# Patient Record
Sex: FEMALE | Race: WHITE | Employment: FULL TIME | URBAN - METROPOLITAN AREA
[De-identification: names, ages, dates, MRNs, and addresses within clinical notes are randomized per-mention and may not be internally consistent; named-entity substitution may affect disease eponyms.]

---

## 2023-08-28 ENCOUNTER — APPOINTMENT (OUTPATIENT)
Dept: CT IMAGING | Facility: CLINIC | Age: 23
End: 2023-08-28
Attending: EMERGENCY MEDICINE
Payer: COMMERCIAL

## 2023-08-28 ENCOUNTER — ANESTHESIA (OUTPATIENT)
Dept: SURGERY | Facility: CLINIC | Age: 23
End: 2023-08-28
Payer: COMMERCIAL

## 2023-08-28 ENCOUNTER — HOSPITAL ENCOUNTER (OUTPATIENT)
Facility: CLINIC | Age: 23
Setting detail: OBSERVATION
Discharge: HOME | End: 2023-08-28
Attending: EMERGENCY MEDICINE | Admitting: SURGERY
Payer: COMMERCIAL

## 2023-08-28 ENCOUNTER — ANESTHESIA EVENT (OUTPATIENT)
Dept: SURGERY | Facility: CLINIC | Age: 23
End: 2023-08-28
Payer: COMMERCIAL

## 2023-08-28 VITALS
DIASTOLIC BLOOD PRESSURE: 60 MMHG | RESPIRATION RATE: 16 BRPM | SYSTOLIC BLOOD PRESSURE: 100 MMHG | WEIGHT: 111.4 LBS | TEMPERATURE: 97.8 F | HEART RATE: 93 BPM | OXYGEN SATURATION: 99 %

## 2023-08-28 DIAGNOSIS — K35.30 ACUTE APPENDICITIS WITH LOCALIZED PERITONITIS, WITHOUT PERFORATION, ABSCESS, OR GANGRENE: ICD-10-CM

## 2023-08-28 LAB
ALBUMIN SERPL BCG-MCNC: 4.4 G/DL (ref 3.5–5.2)
ALP SERPL-CCNC: 58 U/L (ref 35–104)
ALT SERPL W P-5'-P-CCNC: 16 U/L (ref 0–50)
ANION GAP SERPL CALCULATED.3IONS-SCNC: 12 MMOL/L (ref 7–15)
AST SERPL W P-5'-P-CCNC: 18 U/L (ref 0–45)
BASOPHILS # BLD AUTO: 0.1 10E3/UL (ref 0–0.2)
BASOPHILS NFR BLD AUTO: 1 %
BILIRUB SERPL-MCNC: 0.5 MG/DL
BUN SERPL-MCNC: 7.8 MG/DL (ref 6–20)
CALCIUM SERPL-MCNC: 10.3 MG/DL (ref 8.6–10)
CHLORIDE SERPL-SCNC: 99 MMOL/L (ref 98–107)
CREAT SERPL-MCNC: 0.68 MG/DL (ref 0.51–0.95)
DEPRECATED HCO3 PLAS-SCNC: 26 MMOL/L (ref 22–29)
EOSINOPHIL # BLD AUTO: 0.4 10E3/UL (ref 0–0.7)
EOSINOPHIL NFR BLD AUTO: 3 %
ERYTHROCYTE [DISTWIDTH] IN BLOOD BY AUTOMATED COUNT: 11.7 % (ref 10–15)
GFR SERPL CREATININE-BSD FRML MDRD: >90 ML/MIN/1.73M2
GLUCOSE SERPL-MCNC: 101 MG/DL (ref 70–99)
HCG SERPL QL: NEGATIVE
HCT VFR BLD AUTO: 38.8 % (ref 35–47)
HGB BLD-MCNC: 13.9 G/DL (ref 11.7–15.7)
IMM GRANULOCYTES # BLD: 0 10E3/UL
IMM GRANULOCYTES NFR BLD: 0 %
LIPASE SERPL-CCNC: 34 U/L (ref 13–60)
LYMPHOCYTES # BLD AUTO: 2 10E3/UL (ref 0.8–5.3)
LYMPHOCYTES NFR BLD AUTO: 15 %
MCH RBC QN AUTO: 32.8 PG (ref 26.5–33)
MCHC RBC AUTO-ENTMCNC: 35.8 G/DL (ref 31.5–36.5)
MCV RBC AUTO: 92 FL (ref 78–100)
MONOCYTES # BLD AUTO: 0.8 10E3/UL (ref 0–1.3)
MONOCYTES NFR BLD AUTO: 6 %
NEUTROPHILS # BLD AUTO: 10.3 10E3/UL (ref 1.6–8.3)
NEUTROPHILS NFR BLD AUTO: 75 %
NRBC # BLD AUTO: 0 10E3/UL
NRBC BLD AUTO-RTO: 0 /100
PLATELET # BLD AUTO: 214 10E3/UL (ref 150–450)
POTASSIUM SERPL-SCNC: 3.5 MMOL/L (ref 3.4–5.3)
PROT SERPL-MCNC: 7.2 G/DL (ref 6.4–8.3)
RBC # BLD AUTO: 4.24 10E6/UL (ref 3.8–5.2)
SODIUM SERPL-SCNC: 137 MMOL/L (ref 136–145)
WBC # BLD AUTO: 13.7 10E3/UL (ref 4–11)

## 2023-08-28 PROCEDURE — 250N000011 HC RX IP 250 OP 636: Mod: JZ | Performed by: SURGERY

## 2023-08-28 PROCEDURE — 99221 1ST HOSP IP/OBS SF/LOW 40: CPT | Mod: 57 | Performed by: SURGERY

## 2023-08-28 PROCEDURE — 83690 ASSAY OF LIPASE: CPT | Performed by: EMERGENCY MEDICINE

## 2023-08-28 PROCEDURE — 999N000141 HC STATISTIC PRE-PROCEDURE NURSING ASSESSMENT: Performed by: SURGERY

## 2023-08-28 PROCEDURE — 96375 TX/PRO/DX INJ NEW DRUG ADDON: CPT | Mod: XU | Performed by: EMERGENCY MEDICINE

## 2023-08-28 PROCEDURE — 250N000009 HC RX 250: Performed by: NURSE ANESTHETIST, CERTIFIED REGISTERED

## 2023-08-28 PROCEDURE — 74177 CT ABD & PELVIS W/CONTRAST: CPT

## 2023-08-28 PROCEDURE — 250N000009 HC RX 250: Performed by: ANESTHESIOLOGY

## 2023-08-28 PROCEDURE — 88304 TISSUE EXAM BY PATHOLOGIST: CPT | Mod: 26 | Performed by: PATHOLOGY

## 2023-08-28 PROCEDURE — 250N000011 HC RX IP 250 OP 636: Performed by: NURSE ANESTHETIST, CERTIFIED REGISTERED

## 2023-08-28 PROCEDURE — 258N000003 HC RX IP 258 OP 636: Performed by: ANESTHESIOLOGY

## 2023-08-28 PROCEDURE — 258N000003 HC RX IP 258 OP 636: Performed by: NURSE ANESTHETIST, CERTIFIED REGISTERED

## 2023-08-28 PROCEDURE — 96361 HYDRATE IV INFUSION ADD-ON: CPT | Performed by: EMERGENCY MEDICINE

## 2023-08-28 PROCEDURE — 250N000025 HC SEVOFLURANE, PER MIN: Performed by: SURGERY

## 2023-08-28 PROCEDURE — 99285 EMERGENCY DEPT VISIT HI MDM: CPT | Mod: 25 | Performed by: EMERGENCY MEDICINE

## 2023-08-28 PROCEDURE — 44970 LAPAROSCOPY APPENDECTOMY: CPT | Mod: GC | Performed by: SURGERY

## 2023-08-28 PROCEDURE — 96365 THER/PROPH/DIAG IV INF INIT: CPT | Mod: 59 | Performed by: EMERGENCY MEDICINE

## 2023-08-28 PROCEDURE — 99285 EMERGENCY DEPT VISIT HI MDM: CPT | Performed by: EMERGENCY MEDICINE

## 2023-08-28 PROCEDURE — 85025 COMPLETE CBC W/AUTO DIFF WBC: CPT | Performed by: EMERGENCY MEDICINE

## 2023-08-28 PROCEDURE — 80053 COMPREHEN METABOLIC PANEL: CPT | Performed by: EMERGENCY MEDICINE

## 2023-08-28 PROCEDURE — 36415 COLL VENOUS BLD VENIPUNCTURE: CPT | Performed by: EMERGENCY MEDICINE

## 2023-08-28 PROCEDURE — 710N000010 HC RECOVERY PHASE 1, LEVEL 2, PER MIN: Performed by: SURGERY

## 2023-08-28 PROCEDURE — 360N000076 HC SURGERY LEVEL 3, PER MIN: Performed by: SURGERY

## 2023-08-28 PROCEDURE — 250N000009 HC RX 250: Performed by: EMERGENCY MEDICINE

## 2023-08-28 PROCEDURE — 250N000009 HC RX 250: Performed by: SURGERY

## 2023-08-28 PROCEDURE — 258N000003 HC RX IP 258 OP 636: Performed by: EMERGENCY MEDICINE

## 2023-08-28 PROCEDURE — 250N000011 HC RX IP 250 OP 636: Mod: JZ | Performed by: EMERGENCY MEDICINE

## 2023-08-28 PROCEDURE — 88304 TISSUE EXAM BY PATHOLOGIST: CPT | Mod: TC | Performed by: SURGERY

## 2023-08-28 PROCEDURE — 710N000012 HC RECOVERY PHASE 2, PER MINUTE: Performed by: SURGERY

## 2023-08-28 PROCEDURE — 84703 CHORIONIC GONADOTROPIN ASSAY: CPT | Performed by: EMERGENCY MEDICINE

## 2023-08-28 PROCEDURE — 370N000017 HC ANESTHESIA TECHNICAL FEE, PER MIN: Performed by: SURGERY

## 2023-08-28 PROCEDURE — 250N000013 HC RX MED GY IP 250 OP 250 PS 637: Performed by: ANESTHESIOLOGY

## 2023-08-28 PROCEDURE — 250N000011 HC RX IP 250 OP 636: Performed by: STUDENT IN AN ORGANIZED HEALTH CARE EDUCATION/TRAINING PROGRAM

## 2023-08-28 PROCEDURE — 272N000001 HC OR GENERAL SUPPLY STERILE: Performed by: SURGERY

## 2023-08-28 RX ORDER — EPHEDRINE SULFATE 50 MG/ML
5 INJECTION, SOLUTION INTRAMUSCULAR; INTRAVENOUS; SUBCUTANEOUS ONCE
Status: COMPLETED | OUTPATIENT
Start: 2023-08-28 | End: 2023-08-28

## 2023-08-28 RX ORDER — LIDOCAINE HYDROCHLORIDE 10 MG/ML
INJECTION, SOLUTION EPIDURAL; INFILTRATION; INTRACAUDAL; PERINEURAL PRN
Status: DISCONTINUED | OUTPATIENT
Start: 2023-08-28 | End: 2023-08-28 | Stop reason: HOSPADM

## 2023-08-28 RX ORDER — IOPAMIDOL 755 MG/ML
100 INJECTION, SOLUTION INTRAVASCULAR ONCE
Status: COMPLETED | OUTPATIENT
Start: 2023-08-28 | End: 2023-08-28

## 2023-08-28 RX ORDER — SPIRONOLACTONE 50 MG/1
50 TABLET, FILM COATED ORAL DAILY
COMMUNITY

## 2023-08-28 RX ORDER — ONDANSETRON 2 MG/ML
4 INJECTION INTRAMUSCULAR; INTRAVENOUS EVERY 30 MIN PRN
Status: DISCONTINUED | OUTPATIENT
Start: 2023-08-28 | End: 2023-08-28 | Stop reason: HOSPADM

## 2023-08-28 RX ORDER — FENTANYL CITRATE 50 UG/ML
INJECTION, SOLUTION INTRAMUSCULAR; INTRAVENOUS PRN
Status: DISCONTINUED | OUTPATIENT
Start: 2023-08-28 | End: 2023-08-28

## 2023-08-28 RX ORDER — CEFAZOLIN SODIUM/WATER 2 G/20 ML
2 SYRINGE (ML) INTRAVENOUS SEE ADMIN INSTRUCTIONS
Status: DISCONTINUED | OUTPATIENT
Start: 2023-08-28 | End: 2023-08-28 | Stop reason: HOSPADM

## 2023-08-28 RX ORDER — HYDROMORPHONE HCL IN WATER/PF 6 MG/30 ML
0.4 PATIENT CONTROLLED ANALGESIA SYRINGE INTRAVENOUS EVERY 5 MIN PRN
Status: DISCONTINUED | OUTPATIENT
Start: 2023-08-28 | End: 2023-08-28 | Stop reason: HOSPADM

## 2023-08-28 RX ORDER — EPHEDRINE SULFATE 50 MG/ML
10 INJECTION, SOLUTION INTRAMUSCULAR; INTRAVENOUS; SUBCUTANEOUS ONCE
Status: COMPLETED | OUTPATIENT
Start: 2023-08-28 | End: 2023-08-28

## 2023-08-28 RX ORDER — KETOROLAC TROMETHAMINE 15 MG/ML
15 INJECTION, SOLUTION INTRAMUSCULAR; INTRAVENOUS ONCE
Status: COMPLETED | OUTPATIENT
Start: 2023-08-28 | End: 2023-08-28

## 2023-08-28 RX ORDER — IBUPROFEN 600 MG/1
600 TABLET, FILM COATED ORAL EVERY 6 HOURS PRN
Qty: 28 TABLET | Refills: 0 | Status: SHIPPED | OUTPATIENT
Start: 2023-08-28 | End: 2023-09-04

## 2023-08-28 RX ORDER — DEXAMETHASONE SODIUM PHOSPHATE 4 MG/ML
INJECTION, SOLUTION INTRA-ARTICULAR; INTRALESIONAL; INTRAMUSCULAR; INTRAVENOUS; SOFT TISSUE PRN
Status: DISCONTINUED | OUTPATIENT
Start: 2023-08-28 | End: 2023-08-28

## 2023-08-28 RX ORDER — BUPIVACAINE HYDROCHLORIDE 2.5 MG/ML
INJECTION, SOLUTION EPIDURAL; INFILTRATION; INTRACAUDAL PRN
Status: DISCONTINUED | OUTPATIENT
Start: 2023-08-28 | End: 2023-08-28 | Stop reason: HOSPADM

## 2023-08-28 RX ORDER — KETOROLAC TROMETHAMINE 30 MG/ML
INJECTION, SOLUTION INTRAMUSCULAR; INTRAVENOUS PRN
Status: DISCONTINUED | OUTPATIENT
Start: 2023-08-28 | End: 2023-08-28

## 2023-08-28 RX ORDER — ACETAMINOPHEN 325 MG/1
650 TABLET ORAL ONCE
Status: COMPLETED | OUTPATIENT
Start: 2023-08-28 | End: 2023-08-28

## 2023-08-28 RX ORDER — ERTAPENEM 1 G/1
1 INJECTION, POWDER, LYOPHILIZED, FOR SOLUTION INTRAMUSCULAR; INTRAVENOUS ONCE
Status: COMPLETED | OUTPATIENT
Start: 2023-08-28 | End: 2023-08-28

## 2023-08-28 RX ORDER — ONDANSETRON 4 MG/1
4 TABLET, ORALLY DISINTEGRATING ORAL EVERY 30 MIN PRN
Status: DISCONTINUED | OUTPATIENT
Start: 2023-08-28 | End: 2023-08-28 | Stop reason: HOSPADM

## 2023-08-28 RX ORDER — ACETAMINOPHEN 500 MG
1000 TABLET ORAL EVERY 6 HOURS PRN
Status: ON HOLD | COMMUNITY
End: 2023-08-28

## 2023-08-28 RX ORDER — LIDOCAINE HYDROCHLORIDE 20 MG/ML
INJECTION, SOLUTION INFILTRATION; PERINEURAL PRN
Status: DISCONTINUED | OUTPATIENT
Start: 2023-08-28 | End: 2023-08-28

## 2023-08-28 RX ORDER — ONDANSETRON 2 MG/ML
4 INJECTION INTRAMUSCULAR; INTRAVENOUS EVERY 6 HOURS PRN
Status: DISCONTINUED | OUTPATIENT
Start: 2023-08-28 | End: 2023-08-28 | Stop reason: HOSPADM

## 2023-08-28 RX ORDER — ACETAMINOPHEN 500 MG
1000 TABLET ORAL 3 TIMES DAILY
Qty: 42 TABLET | Refills: 0 | Status: SHIPPED | OUTPATIENT
Start: 2023-08-28 | End: 2023-09-04

## 2023-08-28 RX ORDER — ONDANSETRON 4 MG/1
4 TABLET, ORALLY DISINTEGRATING ORAL EVERY 8 HOURS PRN
Qty: 4 TABLET | Refills: 0 | Status: SHIPPED | OUTPATIENT
Start: 2023-08-28

## 2023-08-28 RX ORDER — ONDANSETRON 4 MG/1
4 TABLET, ORALLY DISINTEGRATING ORAL EVERY 6 HOURS PRN
Status: DISCONTINUED | OUTPATIENT
Start: 2023-08-28 | End: 2023-08-28 | Stop reason: HOSPADM

## 2023-08-28 RX ORDER — POLYETHYLENE GLYCOL 3350 17 G/17G
17 POWDER, FOR SOLUTION ORAL DAILY
Status: DISCONTINUED | OUTPATIENT
Start: 2023-08-28 | End: 2023-08-28 | Stop reason: HOSPADM

## 2023-08-28 RX ORDER — SODIUM CHLORIDE, SODIUM GLUCONATE, SODIUM ACETATE, POTASSIUM CHLORIDE AND MAGNESIUM CHLORIDE 526; 502; 368; 37; 30 MG/100ML; MG/100ML; MG/100ML; MG/100ML; MG/100ML
INJECTION, SOLUTION INTRAVENOUS CONTINUOUS PRN
Status: DISCONTINUED | OUTPATIENT
Start: 2023-08-28 | End: 2023-08-28

## 2023-08-28 RX ORDER — PROPOFOL 10 MG/ML
INJECTION, EMULSION INTRAVENOUS PRN
Status: DISCONTINUED | OUTPATIENT
Start: 2023-08-28 | End: 2023-08-28

## 2023-08-28 RX ORDER — SODIUM CHLORIDE, SODIUM LACTATE, POTASSIUM CHLORIDE, CALCIUM CHLORIDE 600; 310; 30; 20 MG/100ML; MG/100ML; MG/100ML; MG/100ML
INJECTION, SOLUTION INTRAVENOUS CONTINUOUS
Status: DISCONTINUED | OUTPATIENT
Start: 2023-08-28 | End: 2023-08-28 | Stop reason: HOSPADM

## 2023-08-28 RX ORDER — PROCHLORPERAZINE 25 MG
25 SUPPOSITORY, RECTAL RECTAL EVERY 12 HOURS PRN
Status: DISCONTINUED | OUTPATIENT
Start: 2023-08-28 | End: 2023-08-28 | Stop reason: HOSPADM

## 2023-08-28 RX ORDER — FENTANYL CITRATE 50 UG/ML
25 INJECTION, SOLUTION INTRAMUSCULAR; INTRAVENOUS EVERY 5 MIN PRN
Status: DISCONTINUED | OUTPATIENT
Start: 2023-08-28 | End: 2023-08-28 | Stop reason: HOSPADM

## 2023-08-28 RX ORDER — OXYCODONE HYDROCHLORIDE 10 MG/1
10 TABLET ORAL
Status: DISCONTINUED | OUTPATIENT
Start: 2023-08-28 | End: 2023-08-28 | Stop reason: HOSPADM

## 2023-08-28 RX ORDER — FENTANYL CITRATE 50 UG/ML
50 INJECTION, SOLUTION INTRAMUSCULAR; INTRAVENOUS EVERY 5 MIN PRN
Status: DISCONTINUED | OUTPATIENT
Start: 2023-08-28 | End: 2023-08-28 | Stop reason: HOSPADM

## 2023-08-28 RX ORDER — PROCHLORPERAZINE MALEATE 5 MG
10 TABLET ORAL EVERY 6 HOURS PRN
Status: DISCONTINUED | OUTPATIENT
Start: 2023-08-28 | End: 2023-08-28 | Stop reason: HOSPADM

## 2023-08-28 RX ORDER — HYDROMORPHONE HCL IN WATER/PF 6 MG/30 ML
0.2 PATIENT CONTROLLED ANALGESIA SYRINGE INTRAVENOUS EVERY 5 MIN PRN
Status: DISCONTINUED | OUTPATIENT
Start: 2023-08-28 | End: 2023-08-28 | Stop reason: HOSPADM

## 2023-08-28 RX ORDER — CEFAZOLIN SODIUM/WATER 2 G/20 ML
2 SYRINGE (ML) INTRAVENOUS
Status: DISCONTINUED | OUTPATIENT
Start: 2023-08-28 | End: 2023-08-28 | Stop reason: HOSPADM

## 2023-08-28 RX ORDER — METHOCARBAMOL 500 MG/1
500 TABLET, FILM COATED ORAL 4 TIMES DAILY PRN
Qty: 28 TABLET | Refills: 0 | Status: SHIPPED | OUTPATIENT
Start: 2023-08-28 | End: 2023-09-04

## 2023-08-28 RX ORDER — OXYCODONE HYDROCHLORIDE 5 MG/1
5 TABLET ORAL
Status: DISCONTINUED | OUTPATIENT
Start: 2023-08-28 | End: 2023-08-28 | Stop reason: HOSPADM

## 2023-08-28 RX ADMIN — Medication 10 MG: at 13:20

## 2023-08-28 RX ADMIN — IOPAMIDOL 54 ML: 755 INJECTION, SOLUTION INTRAVENOUS at 01:45

## 2023-08-28 RX ADMIN — ERTAPENEM SODIUM 1 G: 1 INJECTION, POWDER, LYOPHILIZED, FOR SOLUTION INTRAMUSCULAR; INTRAVENOUS at 03:03

## 2023-08-28 RX ADMIN — PROPOFOL 150 MG: 10 INJECTION, EMULSION INTRAVENOUS at 12:15

## 2023-08-28 RX ADMIN — FENTANYL CITRATE 150 MCG: 50 INJECTION, SOLUTION INTRAMUSCULAR; INTRAVENOUS at 12:15

## 2023-08-28 RX ADMIN — LIDOCAINE HYDROCHLORIDE 80 MG: 20 INJECTION, SOLUTION INFILTRATION; PERINEURAL at 12:15

## 2023-08-28 RX ADMIN — EPHEDRINE SULFATE 5 MG: 5 INJECTION INTRAVENOUS at 15:41

## 2023-08-28 RX ADMIN — DEXMEDETOMIDINE HYDROCHLORIDE 12 MCG: 100 INJECTION, SOLUTION INTRAVENOUS at 12:01

## 2023-08-28 RX ADMIN — KETOROLAC TROMETHAMINE 30 MG: 30 INJECTION, SOLUTION INTRAMUSCULAR at 13:47

## 2023-08-28 RX ADMIN — SODIUM CHLORIDE, SODIUM GLUCONATE, SODIUM ACETATE, POTASSIUM CHLORIDE AND MAGNESIUM CHLORIDE: 526; 502; 368; 37; 30 INJECTION, SOLUTION INTRAVENOUS at 12:01

## 2023-08-28 RX ADMIN — EPHEDRINE SULFATE 5 MG: 5 INJECTION INTRAVENOUS at 14:29

## 2023-08-28 RX ADMIN — KETOROLAC TROMETHAMINE 15 MG: 15 INJECTION, SOLUTION INTRAMUSCULAR; INTRAVENOUS at 01:35

## 2023-08-28 RX ADMIN — DEXAMETHASONE SODIUM PHOSPHATE 6 MG: 4 INJECTION, SOLUTION INTRA-ARTICULAR; INTRALESIONAL; INTRAMUSCULAR; INTRAVENOUS; SOFT TISSUE at 12:23

## 2023-08-28 RX ADMIN — EPHEDRINE SULFATE 10 MG: 5 INJECTION INTRAVENOUS at 16:18

## 2023-08-28 RX ADMIN — SUGAMMADEX 200 MG: 100 INJECTION, SOLUTION INTRAVENOUS at 13:59

## 2023-08-28 RX ADMIN — ACETAMINOPHEN 650 MG: 325 TABLET, FILM COATED ORAL at 17:50

## 2023-08-28 RX ADMIN — SODIUM CHLORIDE 55 ML: 9 INJECTION, SOLUTION INTRAVENOUS at 01:46

## 2023-08-28 RX ADMIN — SODIUM CHLORIDE 1000 ML: 9 INJECTION, SOLUTION INTRAVENOUS at 01:35

## 2023-08-28 RX ADMIN — Medication 2 G: at 12:30

## 2023-08-28 RX ADMIN — DEXMEDETOMIDINE HYDROCHLORIDE 1 MCG/KG/HR: 100 INJECTION, SOLUTION INTRAVENOUS at 12:05

## 2023-08-28 RX ADMIN — SODIUM CHLORIDE, SODIUM GLUCONATE, SODIUM ACETATE, POTASSIUM CHLORIDE AND MAGNESIUM CHLORIDE: 526; 502; 368; 37; 30 INJECTION, SOLUTION INTRAVENOUS at 12:37

## 2023-08-28 RX ADMIN — Medication 50 MG: at 12:15

## 2023-08-28 RX ADMIN — ONDANSETRON 4 MG: 2 INJECTION INTRAMUSCULAR; INTRAVENOUS at 01:35

## 2023-08-28 RX ADMIN — EPHEDRINE SULFATE 5 MG: 5 INJECTION INTRAVENOUS at 15:06

## 2023-08-28 ASSESSMENT — ACTIVITIES OF DAILY LIVING (ADL)
ADLS_ACUITY_SCORE: 35

## 2023-08-28 ASSESSMENT — LIFESTYLE VARIABLES: TOBACCO_USE: 1

## 2023-08-28 NOTE — CONSULTS
[facility]    Consult Note - EGS Service  Date of Admission:  8/28/2023  Consult Requested by: ED staff  Reason for Consult: Acute appendicitis    Assessment & Plan: Surgery   Matilde Granger is a 23 year old female admitted on 8/28/2023. She presents with one day history of RLQ pain with imaging demonstrating dilated appendix with periappendiceal fat stranding consistent with acute appendicitis. Will consent patient for laparoscopic appendectomy.     - consented for lap appy  - NPO, MIVFs  - abx   - pt states that she would not like any narcotics post-operatively        Drains: None     Code Status:  Full    Clinically Significant Risk Factors Present on Admission           # Hypercalcemia: Highest Ca = 10.3 mg/dL in last 2 days, will monitor as appropriate                      The patient's care was discussed with Dr. Vera.    Dejah Cummins MD  [facility]  Non-urgent messages: Securely message with [employer] (more info)  Text page via [employer] Paging/Directory     ______________________________________________________________________    Chief Complaint   Abdominal Pain    History is obtained from the patient    History of Present Illness   Matilde Granger is a 23 year old female with PMHx of substance abuse disorder (no longer actively using) and asthma who presents with acute abdominal pain.     She states that her pain started yesterday morning in her RLQ. She states that her abdominal pain has been persistent and associated with nausea. She denies any emesis, fevers/chills, SOB, chest pain, or dysuria. She states that she last ate a meal yesterday. She denies being on blood thinners. In the ED, her vitals were wnl with labs demonstrating leukocytosis of 13.4K. She underwent a CT A/P that demonstrated dilated appendix with periappendiceal fat stranding without evidence appendiceal perforation or periappendiceal  fluid collection.     Past Medical History    Asthma   Substance Abuse disorder (currently sober)    Past Surgical History   No past surgical history on file.    Prior to Admission Medications   I have reviewed this patient's current medications     Physical Exam   Vital Signs: Temp: 99.3  F (37.4  C) Temp src: Oral BP: 102/59 Pulse: 81   Resp: 16 SpO2: 96 % O2 Device: None (Room air)    Weight: 111 lbs 6.4 ozNo intake or output data in the 24 hours ending 08/28/23 0809    GENERAL: NAD, resting comfortably in bed  HEENT: atraumatic, normocephalic, no scleral icterus  CARDIO: normal rate and regular rhythm, no LE edema  PULM: no increased WOB  ABD: non-distended, soft, and RLQ tenderness  NEURO: CN II-XII grossly intact, no focal neuro deficits  PSYCH: appropriate affect and behavior    Data     I have personally reviewed the following data over the past 24 hrs:    13.7 (H)  \   13.9   / 214     137 99 7.8 /  101 (H)   3.5 26 0.68 \     ALT: 16 AST: 18 AP: 58 TBILI: 0.5   ALB: 4.4 TOT PROTEIN: 7.2 LIPASE: 34       Imaging results reviewed over the past 24 hrs:   Recent Results (from the past 24 hour(s))   CT Abdomen Pelvis w Contrast    Narrative    EXAM: CT ABDOMEN PELVIS W CONTRAST  LOCATION: [facility]  DATE: 8/28/2023    INDICATION: RLQ pain, c f appendicitis.  COMPARISON: None.  TECHNIQUE: CT scan of the abdomen and pelvis was performed after the injection of 54 ml of Isov 370 intravenously. Multiplanar reformats were obtained. Dose reduction techniques were used.    FINDINGS:   LOWER CHEST: 4 mm left lower lobe subpleural nodule (series 5 image 25).    ABDOMEN/PELVIS:    HEPATOBILIARY: No suspicious focal hepatic lesion. No radiodense gallstones.    PANCREAS: No main pancreatic ductal dilatation or definite solid pancreatic mass.    SPLEEN: No splenomegaly.    ADRENAL GLANDS: No adrenal nodules.    KIDNEYS/BLADDER: No radiodense kidney/ureteral stones or  hydronephrosis in either kidney.    BOWEL: No abnormally dilated bowel loops. Dilated appendix containing appendicolith with mild periappendiceal fat stranding, findings worrisome for acute appendicitis. No evidence of appendiceal perforation or periappendiceal loculated collection.   Moderate amount of stool predominantly in the right and transverse colon, nonspecific, can be seen with constipation.    PERITONEUM: No evidence of free fluid in the abdomen and pelvis. No free peritoneal or portal venous gas.    PELVIC ORGANS: Unremarkable.    VASCULATURE: Unremarkable.    LYMPH NODES: No significant abdominopelvic lymphadenopathy.    MUSCULOSKELETAL: No suspicious osseous lesion.      Impression    IMPRESSION:   1. Dilated appendix with periappendiceal fat stranding, findings worrisome for acute appendicitis. No evidence of appendiceal perforation or periappendiceal loculated collection.  2. 4 mm left lower lobe pulmonary nodule, as per Fleischner Society criteria, for low risk patient, no routine follow-up is recommended and for high-risk patient, optional chest CT in 12 months can be considered.

## 2023-08-28 NOTE — PROVIDER NOTIFICATION
Patient continues to have soft BPs. Patient alert and oriented x4; mentating well. Dr. Deal at bedside to give another dose of ephedrine.

## 2023-08-28 NOTE — ANESTHESIA PREPROCEDURE EVALUATION
Anesthesia Pre-Procedure Evaluation    Patient: Matilde Granger   MRN: [id number] : 2000        Procedure : Procedure(s):  LAPAROSCOPIC APPENDECTOMY, POSSIBLE OPEN          History reviewed. No pertinent past medical history.   History reviewed. No pertinent surgical history.   No Known Allergies   Social History     Tobacco Use    Smoking status: Not on file    Smokeless tobacco: Not on file   Substance Use Topics    Alcohol use: Not on file      Wt Readings from Last 1 Encounters:   23 50.5 kg (111 lb 6.4 oz)        Anesthesia Evaluation   Pt has had prior anesthetic. Type: General.        ROS/MED HX  ENT/Pulmonary:     (+)                tobacco use, Past use,    asthma  Treatment: Inhaler prn,                 Neurologic:       Cardiovascular:       METS/Exercise Tolerance:     Hematologic:       Musculoskeletal:       GI/Hepatic:       Renal/Genitourinary:       Endo:       Psychiatric/Substance Use:       Infectious Disease:       Malignancy:       Other:            Physical Exam    Airway        Mallampati: I    Neck ROM: full     Respiratory Devices and Support         Dental       (+) Minor Abnormalities - some fillings, tiny chips      Cardiovascular   cardiovascular exam normal          Pulmonary   pulmonary exam normal                OUTSIDE LABS:  CBC:   Lab Results   Component Value Date    WBC 13.7 (H) 2023    HGB 13.9 2023    HCT 38.8 2023     2023     BMP:   Lab Results   Component Value Date     2023    POTASSIUM 3.5 2023    CHLORIDE 99 2023    CO2 26 2023    BUN 7.8 2023    CR 0.68 2023     (H) 2023     COAGS: No results found for: PTT, INR, FIBR  POC:   Lab Results   Component Value Date    HCGS Negative 2023     HEPATIC:   Lab Results   Component Value Date    ALBUMIN 4.4 2023    PROTTOTAL 7.2 2023    ALT 16 2023    AST 18 2023    ALKPHOS 58 2023    BILITOTAL 0.5  08/28/2023     OTHER:   Lab Results   Component Value Date    NICOLE 10.3 (H) 08/28/2023    LIPASE 34 08/28/2023       Anesthesia Plan    ASA Status:  2, emergent    NPO Status:  NPO Appropriate    Anesthesia Type: General.     - Airway: ETT   Induction: Intravenous, RSI.   Maintenance: Balanced.   Techniques and Equipment:     - Lines/Monitors: 2nd IV     Consents    Anesthesia Plan(s) and associated risks, benefits, and realistic alternatives discussed. Questions answered and patient/representative(s) expressed understanding.     - Discussed: Risks, Benefits and Alternatives for BOTH SEDATION and the PROCEDURE were discussed     - Discussed with:  Patient       Use of blood products discussed: Yes.     - Discussed with: Patient.     Postoperative Care    Pain management: IV analgesics.   PONV prophylaxis: Ondansetron (or other 5HT-3)     Comments:                Sina Gaffney MD

## 2023-08-28 NOTE — PROVIDER NOTIFICATION
Dr. Gfafney notified of hypotension 78/49 (60) after one dose of ephedrine. Order for another dose of ephedrine and will pass on to Dr. Deal.

## 2023-08-28 NOTE — ED PROVIDER NOTES
ED Provider Note  [facility]      History     Chief Complaint   Patient presents with    Abdominal Pain     HPI  Matilde Granger is a 23 year old female PMH of anorexia nervosa, polysubstance dependence who presents ER for evaluation of abdominal pain.    States that she has had severe pain in her lower abdomen.  She started her period yesterday. She states her period seems heavier than normal.Has not had a period in 3 months. This pain is much worse than her normal cramping. It worsens when it is pushed on. She has not had anything like this before.  Patient had nausea but no emesis. She reported feeling dizzy. Urination is normal. No bowel movements today. No appetite.  No hx of abdominal surgery. Takes asthma medications. Has a nexplanon BC implant. Patient states that she is a drug addict in recovery, she is sober and requests no narcotics be given to her during this visit. Patient has already taken tylenol today.    Past Medical History  History reviewed. No pertinent past medical history.  No past surgical history on file.  acetaminophen (TYLENOL) 500 MG tablet  spironolactone (ALDACTONE) 50 MG tablet      No Known Allergies  Family History  No family history on file.  Social History          A medically appropriate review of systems was performed with pertinent positives and negatives noted in the HPI, and all other systems negative.    Physical Exam   BP: 104/69  Pulse: 73  Temp: 98.6  F (37  C)  Resp: 16  Weight: 50.5 kg (111 lb 6.4 oz)  SpO2: 94 %  Physical Exam  Vitals and nursing note reviewed.   Constitutional:       General: She is in acute distress.      Appearance: Normal appearance. She is ill-appearing.   HENT:      Head: Normocephalic.      Nose: Nose normal.   Eyes:      Pupils: Pupils are equal, round, and reactive to light.   Cardiovascular:      Rate and Rhythm: Normal rate and regular rhythm.   Pulmonary:      Effort: Pulmonary effort is normal.   Abdominal:       General: There is no distension.      Comments: Severe tenderness palpation the right lower quadrant.  Guarding present.  No rebound tenderness.  Abdomen soft nondistended.  Positive psoas sign.   Musculoskeletal:         General: No deformity. Normal range of motion.      Cervical back: Normal range of motion.   Skin:     General: Skin is warm.   Neurological:      Mental Status: She is alert and oriented to person, place, and time.   Psychiatric:         Mood and Affect: Mood normal.           ED Course, Procedures, & Data        Results for orders placed or performed during the hospital encounter of 08/28/23   CT Abdomen Pelvis w Contrast     Status: None    Narrative    EXAM: CT ABDOMEN PELVIS W CONTRAST  LOCATION: [facility]  DATE: 8/28/2023    INDICATION: RLQ pain, c f appendicitis.  COMPARISON: None.  TECHNIQUE: CT scan of the abdomen and pelvis was performed after the injection of 54 ml of Isov 370 intravenously. Multiplanar reformats were obtained. Dose reduction techniques were used.    FINDINGS:   LOWER CHEST: 4 mm left lower lobe subpleural nodule (series 5 image 25).    ABDOMEN/PELVIS:    HEPATOBILIARY: No suspicious focal hepatic lesion. No radiodense gallstones.    PANCREAS: No main pancreatic ductal dilatation or definite solid pancreatic mass.    SPLEEN: No splenomegaly.    ADRENAL GLANDS: No adrenal nodules.    KIDNEYS/BLADDER: No radiodense kidney/ureteral stones or hydronephrosis in either kidney.    BOWEL: No abnormally dilated bowel loops. Dilated appendix containing appendicolith with mild periappendiceal fat stranding, findings worrisome for acute appendicitis. No evidence of appendiceal perforation or periappendiceal loculated collection.   Moderate amount of stool predominantly in the right and transverse colon, nonspecific, can be seen with constipation.    PERITONEUM: No evidence of free fluid in the abdomen and pelvis. No free peritoneal or  portal venous gas.    PELVIC ORGANS: Unremarkable.    VASCULATURE: Unremarkable.    LYMPH NODES: No significant abdominopelvic lymphadenopathy.    MUSCULOSKELETAL: No suspicious osseous lesion.      Impression    IMPRESSION:   1. Dilated appendix with periappendiceal fat stranding, findings worrisome for acute appendicitis. No evidence of appendiceal perforation or periappendiceal loculated collection.  2. 4 mm left lower lobe pulmonary nodule, as per Fleischner Society criteria, for low risk patient, no routine follow-up is recommended and for high-risk patient, optional chest CT in 12 months can be considered.   Comprehensive metabolic panel     Status: Abnormal   Result Value Ref Range    Sodium 137 136 - 145 mmol/L    Potassium 3.5 3.4 - 5.3 mmol/L    Chloride 99 98 - 107 mmol/L    Carbon Dioxide (CO2) 26 22 - 29 mmol/L    Anion Gap 12 7 - 15 mmol/L    Urea Nitrogen 7.8 6.0 - 20.0 mg/dL    Creatinine 0.68 0.51 - 0.95 mg/dL    Calcium 10.3 (H) 8.6 - 10.0 mg/dL    Glucose 101 (H) 70 - 99 mg/dL    Alkaline Phosphatase 58 35 - 104 U/L    AST 18 0 - 45 U/L    ALT 16 0 - 50 U/L    Protein Total 7.2 6.4 - 8.3 g/dL    Albumin 4.4 3.5 - 5.2 g/dL    Bilirubin Total 0.5 <=1.2 mg/dL    GFR Estimate >90 >60 mL/min/1.73m2   HCG qualitative Blood     Status: Normal   Result Value Ref Range    hCG Serum Qualitative Negative Negative   Lipase     Status: Normal   Result Value Ref Range    Lipase 34 13 - 60 U/L   CBC with platelets and differential     Status: Abnormal   Result Value Ref Range    WBC Count 13.7 (H) 4.0 - 11.0 10e3/uL    RBC Count 4.24 3.80 - 5.20 10e6/uL    Hemoglobin 13.9 11.7 - 15.7 g/dL    Hematocrit 38.8 35.0 - 47.0 %    MCV 92 78 - 100 fL    MCH 32.8 26.5 - 33.0 pg    MCHC 35.8 31.5 - 36.5 g/dL    RDW 11.7 10.0 - 15.0 %    Platelet Count 214 150 - 450 10e3/uL    % Neutrophils 75 %    % Lymphocytes 15 %    % Monocytes 6 %    % Eosinophils 3 %    % Basophils 1 %    % Immature Granulocytes 0 %    NRBCs per 100  WBC 0 <1 /100    Absolute Neutrophils 10.3 (H) 1.6 - 8.3 10e3/uL    Absolute Lymphocytes 2.0 0.8 - 5.3 10e3/uL    Absolute Monocytes 0.8 0.0 - 1.3 10e3/uL    Absolute Eosinophils 0.4 0.0 - 0.7 10e3/uL    Absolute Basophils 0.1 0.0 - 0.2 10e3/uL    Absolute Immature Granulocytes 0.0 <=0.4 10e3/uL    Absolute NRBCs 0.0 10e3/uL   CBC with platelets differential     Status: Abnormal    Narrative    The following orders were created for panel order CBC with platelets differential.  Procedure                               Abnormality         Status                     ---------                               -----------         ------                     CBC with platelets and d...[[id number]]  Abnormal            Final result                 Please view results for these tests on the individual orders.     Medications   0.9% sodium chloride BOLUS (1,000 mLs Intravenous $New Bag 8/28/23 0135)   ondansetron (ZOFRAN) injection 4 mg (4 mg Intravenous $Given 8/28/23 0135)   ketorolac (TORADOL) injection 15 mg (15 mg Intravenous $Given 8/28/23 0135)   sodium chloride 0.9 % bag 500mL for CT scan flush use (55 mLs As instructed $Given 8/28/23 0146)   iopamidol (ISOVUE-370) solution 100 mL (54 mLs Intravenous $Given 8/28/23 0145)     Labs Ordered and Resulted from Time of ED Arrival to Time of ED Departure   COMPREHENSIVE METABOLIC PANEL - Abnormal       Result Value    Sodium 137      Potassium 3.5      Chloride 99      Carbon Dioxide (CO2) 26      Anion Gap 12      Urea Nitrogen 7.8      Creatinine 0.68      Calcium 10.3 (*)     Glucose 101 (*)     Alkaline Phosphatase 58      AST 18      ALT 16      Protein Total 7.2      Albumin 4.4      Bilirubin Total 0.5      GFR Estimate >90     CBC WITH PLATELETS AND DIFFERENTIAL - Abnormal    WBC Count 13.7 (*)     RBC Count 4.24      Hemoglobin 13.9      Hematocrit 38.8      MCV 92      MCH 32.8      MCHC 35.8      RDW 11.7      Platelet Count 214      % Neutrophils 75      % Lymphocytes  15      % Monocytes 6      % Eosinophils 3      % Basophils 1      % Immature Granulocytes 0      NRBCs per 100 WBC 0      Absolute Neutrophils 10.3 (*)     Absolute Lymphocytes 2.0      Absolute Monocytes 0.8      Absolute Eosinophils 0.4      Absolute Basophils 0.1      Absolute Immature Granulocytes 0.0      Absolute NRBCs 0.0     HCG QUALITATIVE PREGNANCY - Normal    hCG Serum Qualitative Negative     LIPASE - Normal    Lipase 34     ROUTINE UA WITH MICROSCOPIC REFLEX TO CULTURE     CT Abdomen Pelvis w Contrast   Final Result   IMPRESSION:    1. Dilated appendix with periappendiceal fat stranding, findings worrisome for acute appendicitis. No evidence of appendiceal perforation or periappendiceal loculated collection.   2. 4 mm left lower lobe pulmonary nodule, as per Fleischner Society criteria, for low risk patient, no routine follow-up is recommended and for high-risk patient, optional chest CT in 12 months can be considered.             Critical care was not performed.     Medical Decision Making  The patient's presentation was of moderate complexity (an undiagnosed new problem with uncertain diagnosis).    The patient's evaluation involved:  ordering and/or review of 3+ test(s) in this encounter (cbc, cmp, lipase, hcg.  Independently reviewed CT images and radiology interpretation which is concerning for acute appendicitis.)  discussion of management or test interpretation with another health professional (gen surg consult, plan for OR)    The patient's management necessitated moderate risk (prescription drug management including medications given in the ED) and high risk (a decision regarding emergency major procedure (went to the OR with operative service)).    Assessment & Plan    On arrival, patient is normal vital signs patient overall appears to be in severe distress due to her level of abdominal pain.  She has tenderness to palpation in the right lower quadrant with guarding.  Abdomen is otherwise  soft and nondistended.    Labs ordered/reviewed.  White blood cell count is 13.7 with a left neutrophil shift.  Hemoglobin stable at 13.9.  Metabolic panel shows normal electrolytes and normal renal function.  Glucose is 101.  hCG negative.  Lipase is 34.    CT scan shows evidence of acute appendicitis without perforation or abscess.  Patient was started on ertapenem.  Case discussed with general surgery.    Of note, patient admits that she is a recovering drug addict and requests no narcotic pain medication.  Symptoms controlled with IV Toradol and Zofran.    Update:  Spoke w/ gen surg staff.  Would like pt sent to [place] with plan for OR this morning.  D/w [facility] ED staff for transfer.      I have reviewed the nursing notes. I have reviewed the findings, diagnosis, plan and need for follow up with the patient.    New Prescriptions    No medications on file       Final diagnoses:   Acute appendicitis with localized peritonitis, without perforation, abscess, or gangrene     I, Elizabeth Henning, am serving as a trained medical scribe to document services personally performed by Matt Angeles DO, based on the provider's statements to me.     IMatt DO, was physically present and have reviewed and verified the accuracy of this note documented by Elizabeth Henning.    Matt Angeles DO  [facility] EMERGENCY DEPARTMENT  8/28/2023     Matt Angeles DO  08/28/23 0400

## 2023-08-28 NOTE — BRIEF OP NOTE
[facility]    Brief Operative Note    Pre-operative diagnosis: Acute appendicitis with localized peritonitis, without perforation, abscess, or gangrene [K35.30]  Post-operative diagnosis Same as pre-operative diagnosis    Procedure: Procedure(s):  LAPAROSCOPIC APPENDECTOMY  Surgeon: Surgeon(s) and Role:     * Milo Vera MD - Primary     * Dejah Cummins MD - Resident - Assisting     * Carissa Rodríguez MD - Resident - Observing  Anesthesia: General   Estimated Blood Loss: 10cc    Drains: None  Specimens:   ID Type Source Tests Collected by Time Destination   1 : Appendix Tissue Appendix SURGICAL PATHOLOGY EXAM Milo Vera MD 8/28/2023  1:41 PM      Findings: Suppurated and inflammed appendix with a small periappendiceal purulent fluid collection.   Complications: None.  Implants: * No implants in log *    Dejah Cummins,   General Surgery, PGY-2

## 2023-08-28 NOTE — DISCHARGE INSTRUCTIONS
[facility]  Same-Day Surgery   Adult Discharge Orders & Instructions     For 24 hours after surgery    Get plenty of rest.  A responsible adult must stay with you for at least 24 hours after you leave the hospital.   Do not drive or use heavy equipment.  If you have weakness or tingling, don't drive or use heavy equipment until this feeling goes away.  Do not drink alcohol.  Avoid strenuous or risky activities.  Ask for help when climbing stairs.   You may feel lightheaded.  IF so, sit for a few minutes before standing.  Have someone help you get up.   If you have nausea (feel sick to your stomach): Drink only clear liquids such as apple juice, ginger ale, broth or 7-Up.  Rest may also help.  Be sure to drink enough fluids.  Move to a regular diet as you feel able.  You may have a slight fever. Call the doctor if your fever is over 100 F (37.7 C) (taken under the tongue) or lasts longer than 24 hours.  You may have a dry mouth, a sore throat, muscle aches or trouble sleeping.  These should go away after 24 hours.  Do not make important or legal decisions.   Call your doctor for any of the followin.  Signs of infection (fever, growing tenderness at the surgery site, a large amount of drainage or bleeding, severe pain, foul-smelling drainage, redness, swelling).    2. It has been over 8 to 10 hours since surgery and you are still not able to urinate (pass water).    3.  Headache for over 24 hours.    To contact a doctor, call Dr. Vera at General surgery clinic at [phone]  or:    '   [phone] and ask for the resident on call for GENERAL SURGERY (answered 24 hours a day)  '   Emergency Department:    [facility]: [phone]       (TTY for hearing impaired: [phone])

## 2023-08-28 NOTE — ANESTHESIA PROCEDURE NOTES
Airway       Patient location during procedure: OR       Procedure Start/Stop Times: 8/28/2023 12:17 PM  Staff -        Performed By: CRNAIndications and Patient Condition       Indications for airway management: bev-procedural       Induction type:RSI       Mask difficulty assessment: 0 - not attempted    Final Airway Details       Final airway type: endotracheal airway       Successful airway: ETT - single  Endotracheal Airway Details        ETT size (mm): 7.0       Cuffed: yes       Successful intubation technique: direct laryngoscopy    Post intubation assessment        Number of attempts at approach: 1       Ease of procedure: easy    Medication(s) Administered   Medication Administration Time: 8/28/2023 12:17 PM

## 2023-08-28 NOTE — ANESTHESIA POSTPROCEDURE EVALUATION
Patient: Matilde Granger    Procedure: Procedure(s):  LAPAROSCOPIC APPENDECTOMY       Anesthesia Type:  General    Note:  Disposition: Outpatient   Postop Pain Control: Uneventful            Sign Out: Well controlled pain   PONV: No   Neuro/Psych: Uneventful            Sign Out: Acceptable/Baseline neuro status   Airway/Respiratory: Uneventful            Sign Out: Acceptable/Baseline resp. status   CV/Hemodynamics: Uneventful            Sign Out: Acceptable CV status; No obvious hypovolemia; No obvious fluid overload   Other NRE: NONE   DID A NON-ROUTINE EVENT OCCUR? No           Last vitals:  Vitals Value Taken Time   BP 83/50 08/28/23 1450   Temp     Pulse 51 08/28/23 1452   Resp 13 08/28/23 1445   SpO2 100 % 08/28/23 1452   Vitals shown include unvalidated device data.    Electronically Signed By: Sina Gaffney MD  August 28, 2023  2:53 PM

## 2023-08-30 ENCOUNTER — PATIENT OUTREACH (OUTPATIENT)
Dept: SURGERY | Facility: CLINIC | Age: 23
End: 2023-08-30
Payer: COMMERCIAL

## 2023-08-30 NOTE — PROGRESS NOTES
08/30/23    9:17 AM     Matilde Granger is a patient of Dr. Milo Vera that underwent laparoscopic appendectomy approximately 2 days ago (8/28).  Attempted to contact patient via telephone for a status update and review post-op  teaching.  LM on VM to call office.  Await return call.      Of note:  Pathology:  Pending  Wound:  Skin Sealant?  Follow-up:  Routine  Restrictions:  - No lifting, pushing, pulling more than 15-20 pounds for 3-4 weeks  New medications:  Tylenol, Zofran, ibuprofen, Robaxin  Equipment/Supplies:  None

## 2023-08-30 NOTE — OP NOTE
"Operative Report:    Pre operative Diagnosis: Acute appendicitis  ]  Post-operative Diagnosis: Gangrenous  appendicitis    Procedure: Laparoscopic Appendectomy    Staff: Milo Vera MD    Resident Assisting:  Dejah Cummins MD    Anesthetic: General    Clinical History    The patient is a 23-year-old woman with severe right lower quadrant, abdominal pain. The patient also had an elevated white count. A CT scan demonstrated acute appendicitis. The patient understood the risks and the benefits of surgery, including the potential for injury to the abdominal organs, bleeding, infection, the potential need for a second surgery and the potential risks of the general anesthetic. Patient understood these risks and wished to proceed.    Procedure:    The patient was taking to the main operating room under general endotracheal anaesthesia, the patient was prepped and draped in a sterile fashion. Following the \"time out\" procedure to confirm patient identification and procedure a curvilinear incision is made at the umbilicus, a 50-50 mix or 1% lidocaine and 0.25% Marcaine was instilled injected at all of the ports sites.  A 1 mm incision is made with the #11 blade.  The Veress needle was introduced into the abdominal cavity  and an pneumoperitoneum was obtained. A 12 mm port was placed in the abdominal cavity and the laparoscopic camera permitted an inspection of the abdomen.  The appendix was inflamed.  Two 5 mm ports were placed in the abdomen at the left lower quadrant and lower midline through 5 mm incisions made with the #11 blade. The appendix was noted to be gangrenous.  The mesoappendix was taken down with the laparoscopic Ligasure. to sector and the base the appendix was stapled and divided at the base using an   stapler and a \"blue' load.  The appendix was removed through the bev-umbilical site using an endo catch bag and passed off to pathology.  There was purulent material at the RLQ of the abdominal " cavity and in the pelvis. The abdominal cavity was irrigated with abundant sterile normal saline. Hemostasis was excellent at the staple line.  The fascia was re-approximated using PDS sutures and the suture passer.  The skin edges were re-approximated using running subcuticular for Monocryl and skin sealant. Patient tolerated procedure well.  Needle and sponge counts were correct times two.  The patient was returned to postoperative recovery room in good condition.    Milo Vera Jr, MD

## 2023-08-31 LAB
PATH REPORT.COMMENTS IMP SPEC: NORMAL
PATH REPORT.COMMENTS IMP SPEC: NORMAL
PATH REPORT.FINAL DX SPEC: NORMAL
PATH REPORT.GROSS SPEC: NORMAL
PATH REPORT.MICROSCOPIC SPEC OTHER STN: NORMAL
PATH REPORT.RELEVANT HX SPEC: NORMAL
PHOTO IMAGE: NORMAL

## 2023-09-01 NOTE — ANESTHESIA CARE TRANSFER NOTE
Patient: Matilde Granger    Procedure: Procedure(s):  LAPAROSCOPIC APPENDECTOMY       Diagnosis: Acute appendicitis with localized peritonitis, without perforation, abscess, or gangrene [K35.30]  Diagnosis Additional Information: No value filed.    Anesthesia Type:   General     Note:    Oropharynx: oropharynx clear of all foreign objects and spontaneously breathing  Level of Consciousness: awake  Oxygen Supplementation: nasal cannula  Level of Supplemental Oxygen (L/min / FiO2): 3 LPM  Independent Airway: airway patency satisfactory and stable  Dentition: dentition unchanged  Vital Signs Stable: post-procedure vital signs reviewed and stable  Report to RN Given: handoff report given  Patient transferred to: PACU    Handoff Report: Identifed the Patient, Identified the Reponsible Provider, Reviewed the pertinent medical history, Discussed the surgical course, Reviewed Intra-OP anesthesia mangement and issues during anesthesia, Set expectations for post-procedure period and Allowed opportunity for questions and acknowledgement of understanding      Vitals:  Vitals Value Taken Time   BP 96/56 08/28/23 1647   Temp 36.4  C (97.5  F) 08/28/23 1639   Pulse 71 08/28/23 1647   Resp 16 08/28/23 1647   SpO2 96 % 08/28/23 1647       Electronically Signed By: CLARE Stuart CRNA  September 1, 2023  7:04 AM

## 2023-09-12 NOTE — TELEPHONE ENCOUNTER
REFERRAL INFORMATION:  Referring Provider: N/A  Referring Clinic: N/A  Reason for Visit/Diagnosis: PO from 8/28/23 Appendectomy       FUTURE VISIT INFORMATION:  Appointment Date: 9/14/2023  Appointment Time: 10:30 AM     NOTES RECORD STATUS  DETAILS   OFFICE NOTE from Referring Provider N/A    OFFICE NOTE from Other Specialists N/A    HOSPITAL DISCHARGE SUMMARY/ ED VISITS  Internal [facility]:  8/28/23 - ED OV with Dr. Angeles   OPERATIVE REPORT Internal [facility]:  8/28/23 - OP Note for LAPAROSCOPIC APPENDECTOMY with Dr. Vera   PERTINENT LABS Care Everywhere / Internal    PATHOLOGY REPORTS (RELATED) Internal [facility]:  8/28/23 - Appendix (Case: [id number])   IMAGING (CT, MRI, US, XR)  Internal [facility]:  8/28/23 - CT Abd/Pelvis

## 2023-09-14 ENCOUNTER — PRE VISIT (OUTPATIENT)
Dept: SURGERY | Facility: CLINIC | Age: 23
End: 2023-09-14

## 2023-09-14 ENCOUNTER — VIRTUAL VISIT (OUTPATIENT)
Dept: SURGERY | Facility: CLINIC | Age: 23
End: 2023-09-14
Payer: COMMERCIAL

## 2023-09-14 DIAGNOSIS — Z98.890 POSTOPERATIVE STATE: Primary | ICD-10-CM

## 2023-09-14 PROCEDURE — 99024 POSTOP FOLLOW-UP VISIT: CPT | Mod: 95 | Performed by: SURGERY

## 2023-09-14 NOTE — PATIENT INSTRUCTIONS
You met with Dr. Milo Vera.      Today's visit instructions:    Return to the Surgery Clinic on an as needed basis.        If you have questions please contact Loulou RN or Mariam RN during regular clinic hours, Monday through Friday 7:30 AM - 4:00 PM, or you can contact us via QR Wild at anytime.       If you have urgent needs after-hours, weekends, or holidays please call the hospital at [phone] and ask to speak with our on-call General Surgery Team.    Appointment [phone]  Nurse Advice (Loulou or Mariam): [phone]   Surgery Scheduler (Nancy): [phone]  Fax: [phone]

## 2023-09-14 NOTE — PROGRESS NOTES
Matilde Granger is a 23 year old who is being evaluated via a billable video visit.      How would you like to obtain your AVS? MyChart  If the video visit is dropped, the invitation should be resent by: Text to cell phone: [phone]  Will anyone else be joining your video visit? No  If patient encounters technical issues they should call [phone]    During this virtual visit the patient is located in [place], patient verifies this as the location during the entirety of this visit.     Patient was not able to connect via video. Appointment completed via telephone.     Telephone-Visit Details    Type of service:  Telephone Visit    Duration of call: 10 minutes    Originating Location (pt. 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 Patient has done well she has no concerns. Her symptoms have resolved completely. The abdomen is has no new problems. The incisions are healing without complications. The patient is interested to know if a gross photograph was taken of the inflamed appendix. The note from Pathology states that a gross photograph was taken.     PAST MEDICAL HISTORY:  No past medical history on file.     PAST SURGICAL HISTORY:  Past Surgical History:   Procedure Laterality Date    LAPAROSCOPIC APPENDECTOMY N/A 8/28/2023    Procedure: LAPAROSCOPIC APPENDECTOMY;  Surgeon: Milo Vera MD;  Location:  OR        MEDICATIONS:  Current Outpatient Medications   Medication    ondansetron (ZOFRAN ODT) 4 MG ODT tab    spironolactone (ALDACTONE) 50 MG tablet     No current facility-administered medications for this visit.     ALLERGIES:  No Known Allergies     SOCIAL HISTORY:  Social History     Socioeconomic History    Marital status: Single     FAMILY  HISTORY:  No family history on file.     A/P: Patient has completed all of her surgical follow up. No additional follow up is needed.

## 2023-09-14 NOTE — LETTER
9/14/2023       RE: Matilde Granger  [address]  [facility] [address]     Dear Colleague,    Thank you for referring your patient, Matilde Granger, to the [facility]. Please see a copy of my visit note below.    Matilde Granger is a 23 year old who is being evaluated via a billable video visit.      How would you like to obtain your AVS? MyChart  If the video visit is dropped, the invitation should be resent by: Text to cell phone: [phone]  Will anyone else be joining your video visit? No  If patient encounters technical issues they should call [phone]    During this virtual visit the patient is located in [place], patient verifies this as the location during the entirety of this visit.     Patient was not able to connect via video. Appointment completed via telephone.     Telephone-Visit Details    Type of service:  Telephone Visit    Duration of call: 10 minutes    Originating Location (pt. 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 Patient has done well she has no concerns. Her symptoms have resolved completely. The abdomen is has no new problems. The incisions are healing without complications. The patient is interested to know if a gross photograph was taken of the inflamed appendix. The note from Pathology states that a gross photograph was taken.     PAST MEDICAL HISTORY:  No past medical history on file.     PAST SURGICAL HISTORY:  Past Surgical History:   Procedure Laterality Date    LAPAROSCOPIC APPENDECTOMY N/A 8/28/2023    Procedure: LAPAROSCOPIC APPENDECTOMY;  Surgeon: Milo Vera MD;  Location:  OR         MEDICATIONS:  Current Outpatient Medications   Medication    ondansetron (ZOFRAN ODT) 4 MG ODT tab    spironolactone (ALDACTONE) 50 MG tablet     No current facility-administered medications for this visit.     ALLERGIES:  No Known Allergies     SOCIAL HISTORY:  Social History     Socioeconomic History    Marital status: Single     FAMILY HISTORY:  No family history on file.     A/P: Patient has completed all of her surgical follow up. No additional follow up is needed.        Again, thank you for allowing me to participate in the care of your patient.      Sincerely,    Milo Vera MD

## (undated) DEVICE — ENDO TROCAR FIRST ENTRY KII FIOS Z-THRD 05X100MM CTF03

## (undated) DEVICE — LINEN TOWEL PACK X6 WHITE 5487

## (undated) DEVICE — LINEN TOWEL PACK X5 5464

## (undated) DEVICE — ENDO TROCAR SLEEVE KII Z-THREADED 05X100MM CTS02

## (undated) DEVICE — WIPES FOLEY CARE SURESTEP PROVON DFC100

## (undated) DEVICE — DRSG GAUZE 4X4" TRAY 6939

## (undated) DEVICE — STPL POWERED ECHELON 45MM PSEE45A

## (undated) DEVICE — ESU GROUND PAD ADULT W/CORD E7507

## (undated) DEVICE — SU MONOCRYL 4-0 PS-2 27" UND Y426H

## (undated) DEVICE — SOL WATER IRRIG 1000ML BOTTLE 2F7114

## (undated) DEVICE — SU DERMABOND ADVANCED .7ML DNX12

## (undated) DEVICE — SOL NACL 0.9% INJ 1000ML BAG 2B1324X

## (undated) DEVICE — ESU LIGASURE MARYLAND LAPAROSCOPIC SLR/DVDR 5MMX37CM LF1937

## (undated) DEVICE — DRSG PRIMAPORE 02X3" 7133

## (undated) DEVICE — SU VICRYL 3-0 SH 27" UND J416H

## (undated) DEVICE — GLOVE BIOGEL PI MICRO INDICATOR UNDERGLOVE SZ 7.5 48975

## (undated) DEVICE — GOWN REINFORCED XXLG 9071

## (undated) DEVICE — SU PDS II 0 CT-1 27" Z340H

## (undated) DEVICE — STPL RELOAD REG TISSUE ECHELON 45 X 3.6MM BLUE GST45B

## (undated) DEVICE — ANTIFOG SOLUTION W/FOAM PAD 31142527

## (undated) DEVICE — Device

## (undated) DEVICE — GLOVE BIOGEL PI ULTRATOUCH SZ 7.5 41175

## (undated) DEVICE — ENDO TROCAR FIRST ENTRY KII FIOS Z-THRD 12X100MM CTF73

## (undated) DEVICE — ENDO POUCH UNIV RETRIEVAL SYSTEM INZII 10MM CD001

## (undated) DEVICE — DEVICE SUTURE PASSER 14GA WECK EFX EFXSP2

## (undated) DEVICE — CATH TRAY FOLEY SURESTEP 16FR W/TMP PRB STLK LATEX A319416AM

## (undated) DEVICE — NDL INSUFFLATION 13GA 120MM C2201

## (undated) DEVICE — SUCTION IRR STRYKERFLOW II W/TIP 250-070-520

## (undated) DEVICE — SUCTION MANIFOLD NEPTUNE 2 SYS 4 PORT 0702-020-000

## (undated) DEVICE — PREP CHLORAPREP 26ML TINTED HI-LITE ORANGE 930815

## (undated) DEVICE — TUBING SMOKE EVAC PNEUMOCLEAR HIGH FLOW 0620050250

## (undated) RX ORDER — CEFAZOLIN SODIUM/WATER 2 G/20 ML
SYRINGE (ML) INTRAVENOUS
Status: DISPENSED
Start: 2023-08-28

## (undated) RX ORDER — LIDOCAINE HYDROCHLORIDE 10 MG/ML
INJECTION, SOLUTION EPIDURAL; INFILTRATION; INTRACAUDAL; PERINEURAL
Status: DISPENSED
Start: 2023-08-28

## (undated) RX ORDER — SODIUM CHLORIDE, SODIUM LACTATE, POTASSIUM CHLORIDE, CALCIUM CHLORIDE 600; 310; 30; 20 MG/100ML; MG/100ML; MG/100ML; MG/100ML
INJECTION, SOLUTION INTRAVENOUS
Status: DISPENSED
Start: 2023-08-28

## (undated) RX ORDER — BUPIVACAINE HYDROCHLORIDE 2.5 MG/ML
INJECTION, SOLUTION EPIDURAL; INFILTRATION; INTRACAUDAL
Status: DISPENSED
Start: 2023-08-28

## (undated) RX ORDER — EPHEDRINE SULFATE 50 MG/ML
INJECTION, SOLUTION INTRAMUSCULAR; INTRAVENOUS; SUBCUTANEOUS
Status: DISPENSED
Start: 2023-08-28

## (undated) RX ORDER — ACETAMINOPHEN 325 MG/1
TABLET ORAL
Status: DISPENSED
Start: 2023-08-28

## (undated) RX ORDER — FENTANYL CITRATE 50 UG/ML
INJECTION, SOLUTION INTRAMUSCULAR; INTRAVENOUS
Status: DISPENSED
Start: 2023-08-28